# Patient Record
Sex: FEMALE | Race: OTHER
[De-identification: names, ages, dates, MRNs, and addresses within clinical notes are randomized per-mention and may not be internally consistent; named-entity substitution may affect disease eponyms.]

---

## 2021-05-31 ENCOUNTER — HOSPITAL ENCOUNTER (EMERGENCY)
Dept: HOSPITAL 8 - ED | Age: 26
Discharge: HOME | End: 2021-05-31
Payer: COMMERCIAL

## 2021-05-31 VITALS — DIASTOLIC BLOOD PRESSURE: 71 MMHG | SYSTOLIC BLOOD PRESSURE: 122 MMHG

## 2021-05-31 VITALS — BODY MASS INDEX: 31.64 KG/M2 | HEIGHT: 63 IN | WEIGHT: 178.57 LBS
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
## 2021-05-31 DIAGNOSIS — S80.211A: ICD-10-CM

## 2021-05-31 DIAGNOSIS — S93.402A: Primary | ICD-10-CM

## 2021-05-31 DIAGNOSIS — M25.472: ICD-10-CM
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
## 2021-05-31 PROCEDURE — 96372 THER/PROPH/DIAG INJ SC/IM: CPT

## 2021-05-31 PROCEDURE — 73610 X-RAY EXAM OF ANKLE: CPT

## 2021-05-31 PROCEDURE — 99283 EMERGENCY DEPT VISIT LOW MDM: CPT

## 2021-05-31 NOTE — NUR
NO S/S OF RX RXN. AIR STIRRUP IN PLACE BY TECH. PT EDUCATED ON USE. DC 
EDUCATION PROVIDED, PT DEMONSTRATES UNDERSTANDING. PT AMBULATED STEADILY WITH 
CRUTCHES TO DC. FAMILY TO TRANSPORT PT HOME.

## 2021-05-31 NOTE — NUR
Patient presents to ER c/o left ankle pain and swelling. Patient states she 
fell while gardening. No LOC. Admits to ETOH today. 

Left ankle swelling and deformity noted. Patient in NAD.